# Patient Record
Sex: FEMALE | Race: WHITE | URBAN - METROPOLITAN AREA
[De-identification: names, ages, dates, MRNs, and addresses within clinical notes are randomized per-mention and may not be internally consistent; named-entity substitution may affect disease eponyms.]

---

## 2023-02-16 NOTE — PROGRESS NOTES
Annual exam    Marilee Mobley is a 29 y.o. presenting for annual exam. Her main concerns today include wellness screening and future pregnancy planning. She has several topics for discussion today. Currently using Nuvaring for contraception. She and her , Erlinda Lucio, interested in future conception.  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 He quit his job in Dec to start this new business. Patient states she has had anxiety since childhood, has not taken any medication for this recently. Does have a therapist.  She took zoloft in college for about 2 years and it was helpful during that time. Has an upcoming appt with a Psych NP, this will be her first visit. Also has herniated disc at L5-S1, has received multiple injections to manage pain, currently takes occasional gabapentin. Patient previously requested to have laminectomy done but surgeon did not recommend d/t fissures in discs above and below. Now not having any pain, but in the past few months she has noted a slight increase in pain since she has been out of her gym regimen. 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     She declines a chaperone during the gynecologic exam today. Ob/Gyn Hx:  G0  LMP - 2/13/23  Menses - regular  Contraception - Nuvaring  STI - no  SA - yes    Health maintenance:  Pap - normal last year per pt (at [facility]), had colposcopy following prior 2 paps  Gardasil -      Past Medical History:   Diagnosis Date    Anxiety        History reviewed. No pertinent surgical history. History reviewed. No pertinent family history.     Social History     Socioeconomic History    Marital status:      Spouse name: Not on file    Number of children: Not on file    Years of education: Not on file    Highest education level: Not on file   Occupational History    Not on file Tobacco Use    Smoking status: Never    Smokeless tobacco: Never   Substance and Sexual Activity    Alcohol use: Not on file    Drug use: Not on file    Sexual activity: Yes     Partners: Male     Birth control/protection: Inserts   Other Topics Concern    Not on file   Social History Narrative    Not on file     Social Determinants of Health     Financial Resource Strain: Not on file   Food Insecurity: Not on file   Transportation Needs: Not on file   Physical Activity: Not on file   Stress: Not on file   Social Connections: Not on file   Intimate Partner Violence: Not on file   Housing Stability: Not on file           No Known Allergies    Review of Systems - History obtained from the patient  Constitutional: negative for weight loss, fever, night sweats  HEENT: negative for hearing loss, earache, congestion, snoring, sorethroat  CV: negative for chest pain, palpitations, edema  Resp: negative for cough, shortness of breath, wheezing  GI: negative for change in bowel habits, abdominal pain, black or bloody stools  : negative for frequency, dysuria, hematuria, vaginal discharge  MSK: negative for back pain, joint pain, muscle pain  Breast: negative for breast lumps, nipple discharge, galactorrhea  Skin :negative for itching, rash, hives  Neuro: negative for dizziness, headache, confusion, weakness  Psych: negative for anxiety, depression, change in mood  Heme/lymph: negative for bleeding, bruising, pallor    Physical Exam    Visit Vitals  /64   Wt 143 lb (64.9 kg)   LMP 02/13/2023       Constitutional  Appearance: well-nourished, well developed, alert, in no acute distress    HENT  Head and Face: appears normal    Neck  Inspection/Palpation: normal appearance, no masses or tenderness  Lymph Nodes: no lymphadenopathy present  Thyroid: gland size normal, nontender, no nodules or masses present on palpation    Chest  Respiratory Effort: non-labored breathing  Auscultation: CTAB, normal breath sounds    Cardiovascular  Heart: Auscultation: regular rate and rhythm without murmur  Extremities: no peripheral edema    Breasts  Inspection of Breasts: breasts symmetrical, no skin changes, no discharge present, nipple appearance normal, no skin retraction present  Palpation of Breasts and Axillae: no masses present on palpation, no breast tenderness  Axillary Lymph Nodes: no lymphadenopathy present    Gastrointestinal  Abdominal Examination: abdomen non-tender to palpation, normal bowel sounds, no masses present  Liver and spleen: no hepatomegaly present, spleen not palpable  Hernias: no hernias identified    Genitourinary  External Genitalia: normal appearance for age, no discharge present, no tenderness present, no inflammatory lesions present, no masses present, no atrophy present  Vagina: normal vaginal vault without central or paravaginal defects, no discharge present, no inflammatory lesions present, no masses present  Bladder: non-tender to palpation  Urethra: appears normal  Cervix: normal   Uterus: normal size, shape and consistency  Adnexa: no adnexal tenderness present, no adnexal masses present  Perineum: perineum within normal limits, no evidence of trauma, no rashes or skin lesions present    Skin  General Inspection: no rash, no lesions identified    Neurologic/Psychiatric  Mental Status:  Orientation: grossly oriented to person, place and time  Mood and Affect: mood normal, affect appropriate      Assessment/Plan:  29 y.o. presenting for annual exam. Overall doing well. Well woman exam:  Normal gynecologic and breast exams. Healthy habits and lifestyle reviewed. Pap with HPV performed today. Patient declines STD screening. Contraception and menstrual regulation - patient opts for cont nuvaring for now. Preconception planning -   She will likely start conception attempts in about 6 months.  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 Cont PNV. Discussed methods of ovulation prediction and timed intercourse. We discussed her concerns in detail. Anxiety - cont therapist sessions and agree with Psych consult for additional assistance. Reassured re the safety of most SSRIs in pregnancy and postpartum. Also advised buspar is a good option for episodic anxiety. L5-S1 herniated disc - s/p PT in the past. Discussed I feel resuming PT and core/back strenghtening exercises would be of benefit when she is closer to conception attempts.        Jaye Hodges MD

## 2023-02-17 ENCOUNTER — OFFICE VISIT (OUTPATIENT)
Dept: OBGYN CLINIC | Age: 33
End: 2023-02-17
Payer: COMMERCIAL

## 2023-02-17 VITALS — DIASTOLIC BLOOD PRESSURE: 64 MMHG | SYSTOLIC BLOOD PRESSURE: 106 MMHG | WEIGHT: 143 LBS

## 2023-02-17 DIAGNOSIS — Z01.419 ENCOUNTER FOR GYNECOLOGICAL EXAMINATION WITHOUT ABNORMAL FINDING: Primary | ICD-10-CM

## 2023-02-17 RX ORDER — GABAPENTIN 100 MG/1
100 CAPSULE ORAL 3 TIMES DAILY
COMMUNITY
Start: 2022-04-25

## 2023-02-17 RX ORDER — ETONOGESTREL AND ETHINYL ESTRADIOL 11.7; 2.7 MG/1; MG/1
1 INSERT, EXTENDED RELEASE VAGINAL
COMMUNITY
Start: 2022-04-01

## 2023-04-26 ENCOUNTER — OFFICE VISIT (OUTPATIENT)
Dept: ORTHOPEDIC SURGERY | Age: 33
End: 2023-04-26
Payer: COMMERCIAL

## 2023-04-26 VITALS — WEIGHT: 140 LBS | BODY MASS INDEX: 25.76 KG/M2 | HEIGHT: 62 IN

## 2023-04-26 DIAGNOSIS — M43.10 SPONDYLOLYSIS WITH SPONDYLOLISTHESIS: ICD-10-CM

## 2023-04-26 DIAGNOSIS — M43.06 SPONDYLOLYSIS OF LUMBAR REGION: ICD-10-CM

## 2023-04-26 DIAGNOSIS — G89.29 CHRONIC LOW BACK PAIN, UNSPECIFIED BACK PAIN LATERALITY, UNSPECIFIED WHETHER SCIATICA PRESENT: Primary | ICD-10-CM

## 2023-04-26 DIAGNOSIS — M54.50 CHRONIC LOW BACK PAIN, UNSPECIFIED BACK PAIN LATERALITY, UNSPECIFIED WHETHER SCIATICA PRESENT: Primary | ICD-10-CM

## 2023-04-26 PROCEDURE — 99204 OFFICE O/P NEW MOD 45 MIN: CPT | Performed by: STUDENT IN AN ORGANIZED HEALTH CARE EDUCATION/TRAINING PROGRAM

## 2023-04-26 RX ORDER — MELOXICAM 15 MG/1
15 TABLET ORAL DAILY
Qty: 14 TABLET | Refills: 3 | Status: CANCELLED | OUTPATIENT
Start: 2023-04-26

## 2023-04-26 NOTE — PROGRESS NOTES
Angel Hendrickson (: 1990) is a 29 y.o. female here for evaluation of the following chief complaint(s):  Back Pain       ASSESSMENT/PLAN:  Below is the assessment and plan developed based on review of pertinent history, physical exam, labs, studies, and medications. 1. Chronic low back pain, unspecified back pain laterality, unspecified whether sciatica present  -     XR SPINE LUMB MIN 4 V; Future  -     MRI LUMB SPINE WO CONT; Future  2. Spondylolysis of lumbar region  3. Spondylolysis with spondylolisthesis      Return in about 4 weeks (around 2023) for MRI review. Follow-up after MRI lumbar spine. Consider MBB at next visit. Red flag symptoms discussed with the patient. Patient is to present to the emergency department if any of these symptoms occur. Patient verbalized understanding and agrees to proceed with the aforementioned plan. Thank you for allowing me to participate in the care of this patient. SUBJECTIVE/OBJECTIVE:    HPI    Patient is a pleasant 29 y.o. F who presents with atraumatic chronic mechanical back pain. Known h/o L5 pars defect with Grade 1 spondy. Symptoms have been slowly progressive over the past several years but worse over the past several months. It is worse with bending/twisting/lifting through the spine, activity, prolonged sitting, standing, and walking and better with lying flat and rest.    Patient ambulates wo assist.  Normal ambulatory capacity. No gait instability. No fall history. No upper extremity dexterity issues. No bowel/bladder control issues. No other red flag symptoms. Therapies (Rx/PT/Injections): no formal PT within the past 6 months, NSAIDs with only partial/temporary relief, and no recent injections    Relevant PMH/Social Hx: Trying to get pregnant. Relevant Orthopedic Hx: LBP with LR. Prior Spine Surgery: None.       Chief Complaint   Patient presents with    Back Pain     Current Outpatient Medications   Medication Sig ethinyl estradiol-etonogestrel (NUVARING) 0.12-0.015 mg/24 hr vaginal ring Insert 1 ring vaginally every 4 weeks    gabapentin (NEURONTIN) 100 mg capsule Take 1 Capsule by mouth three (3) times daily. prenatal multivit-ca-min-fe-fa tab Take  by mouth. No current facility-administered medications for this visit. Past Medical History:   Diagnosis Date    Anxiety      History reviewed. No pertinent surgical history. History reviewed. No pertinent family history.   Social History     Tobacco Use    Smoking status: Never    Smokeless tobacco: Never      Social History     Tobacco Use   Smoking Status Never   Smokeless Tobacco Never     Social History     Substance and Sexual Activity   Alcohol Use None       Review of Systems  Red flag symptoms: None  Bowel/Bladder/Saddle Anesthesia: Denies  Weakness/Sensory Disturbance: Denies  Ambulation/Falls: Denies  Constitutional Symptoms (F/C/NS/WL): Denies    Ht 5' 2\" (1.575 m)   Wt 140 lb (63.5 kg)   BMI 25.61 kg/m²      Physical Exam    GENERAL:  AAOx3, appears stated age, no distress  Body habitus: Normal    LOWER EXTREMITIES:  Gait: Intact heel and toe gait, intact tandem gait   Motor: 5/5 in all myotomes L3-S1 bilaterally  Sensory: Intact to light touch in all dermatomes L4-S1 bilaterally  Reflexes: Normal L4 and S1 bilaterally  Pathological reflexes: No sustained clonus, down going Babinski bilaterally   Special tests: Neg seated SLR BL    UPPER EXTREMITIES:  Motor: 5/5 in all myotomes C5-T1 bilaterally  Sensory: Intact to light touch in all dermatomes C5-T1 bilaterally  Reflexes: Normal C5-C7 reflexes bilaterally  Pathological reflexes: Negative Lalo's bilaterally  Special tests: Negative Spurling's    NECK/BACK:  Integumentary: Normal  Palpation/ROM: Paraspinal tenderness to palpation; decreased range of motion in all planes, primarily flexion/extension/rotation, secondary to pain and stiffness      IMAGING:    XR Results (most recent):  Results from Appointment encounter on 04/26/23    XR SPINE LUMB MIN 4 V    Narrative  4 views lumbar spine including flexion-extension with grade 1 spondylolisthesis secondary to chronic pars defect. No gross instability on dynamic films. No coronal deformity. An electronic signature was used to authenticate this note.   -- Master Billingsley DO

## 2023-04-26 NOTE — PROGRESS NOTES
1. Have you been to the ER, urgent care clinic since your last visit? Hospitalized since your last visit? No    2. Have you seen or consulted any other health care providers outside of the [address] [place] [address] since your last visit? Include any pap smears or colon screening.  No  Chief Complaint   Patient presents with    Back Pain

## 2023-04-27 DIAGNOSIS — M54.9 BACK PAIN, UNSPECIFIED BACK LOCATION, UNSPECIFIED BACK PAIN LATERALITY, UNSPECIFIED CHRONICITY: Primary | ICD-10-CM

## 2023-05-02 ENCOUNTER — HOSPITAL ENCOUNTER (OUTPATIENT)
Dept: MRI IMAGING | Age: 33
Discharge: HOME | End: 2023-05-02
Attending: STUDENT IN AN ORGANIZED HEALTH CARE EDUCATION/TRAINING PROGRAM
Payer: COMMERCIAL

## 2023-05-02 DIAGNOSIS — M54.50 CHRONIC LOW BACK PAIN, UNSPECIFIED BACK PAIN LATERALITY, UNSPECIFIED WHETHER SCIATICA PRESENT: ICD-10-CM

## 2023-05-02 DIAGNOSIS — G89.29 CHRONIC LOW BACK PAIN, UNSPECIFIED BACK PAIN LATERALITY, UNSPECIFIED WHETHER SCIATICA PRESENT: ICD-10-CM

## 2023-05-02 PROCEDURE — 72148 MRI LUMBAR SPINE W/O DYE: CPT

## 2023-09-21 LAB
CHOLEST SERPL-MCNC: 180 MG/DL
GLUCOSE SERPL-MCNC: 92 MG/DL (ref 65–100)
HDLC SERPL-MCNC: 74 MG/DL
LDLC SERPL CALC-MCNC: 95.4 MG/DL (ref 0–100)
TRIGL SERPL-MCNC: 53 MG/DL

## 2024-07-02 LAB
CHOLEST SERPL-MCNC: 159 MG/DL
GLUCOSE SERPL-MCNC: 91 MG/DL (ref 65–100)
HDLC SERPL-MCNC: 73 MG/DL
LDLC SERPL CALC-MCNC: 72.4 MG/DL (ref 0–100)
TRIGL SERPL-MCNC: 68 MG/DL

## 2024-07-03 ENCOUNTER — OFFICE VISIT (OUTPATIENT)
Age: 34
End: 2024-07-03
Payer: COMMERCIAL

## 2024-07-03 VITALS
SYSTOLIC BLOOD PRESSURE: 116 MMHG | HEIGHT: 62 IN | WEIGHT: 151 LBS | DIASTOLIC BLOOD PRESSURE: 70 MMHG | BODY MASS INDEX: 27.79 KG/M2

## 2024-07-03 DIAGNOSIS — Z01.419 ENCOUNTER FOR WELL WOMAN EXAM WITH ROUTINE GYNECOLOGICAL EXAM: Primary | ICD-10-CM

## 2024-07-03 PROCEDURE — 99395 PREV VISIT EST AGE 18-39: CPT | Performed by: OBSTETRICS & GYNECOLOGY

## 2024-07-03 RX ORDER — MULTIVITAMIN,THERAPEUTIC
1 TABLET ORAL DAILY
COMMUNITY

## 2024-07-03 RX ORDER — DESVENLAFAXINE SUCCINATE 100 MG/1
TABLET, EXTENDED RELEASE ORAL
COMMUNITY
Start: 2023-06-01

## 2024-07-03 NOTE — PROGRESS NOTES
Chief Complaint   Patient presents with    Annual Exam     Pt being seen for annual exam. Cycle length has changed but is still regular. Relays this is likely due to the stress from her previous relationship. Does not feel stressed anymore but cycle has not gone back to what it was.    Wants to discuss non-hormonal birth control options, leaning copper IUD    Ob/Gyn Hx:  G0  LMP - Patient's last menstrual period was 06/11/2024.  Menses -  regular, last 3-4 days. Cycle length has extended by 7-9 days  Contraception - none  Hx of STI - declines testing  SA - yes, male    Health maintenance:  Last Pap: 2/17/2023 NIL, HPV negative  Gardasil: completed    1. Have you been to the ER, urgent care clinic, or hospitalized since your last visit? no    2. Have you seen or consulted any other health care providers outside of the [facility] since your last visit? no    Patient declines chaperone.    Tatiana Almaguer LPN       
  Transportation Needs: Not on file   Physical Activity: Not on file   Stress: Not on file   Social Connections: Not on file   Intimate Partner Violence: Not on file   Housing Stability: Not on file       Current Outpatient Medications   Medication Sig Dispense Refill    PRISTIQ 100 MG TB24 extended release tablet       Multiple Vitamin (THERA/BETA-CAROTENE) TABS Take 1 tablet by mouth daily      etonogestrel-ethinyl estradiol (NUVARING) 0.12-0.015 MG/24HR vaginal ring Place 1 each vaginally (Patient not taking: Reported on 7/3/2024)      gabapentin (NEURONTIN) 100 MG capsule Take 1 capsule by mouth 3 times daily. (Patient not taking: Reported on 7/3/2024)       No current facility-administered medications for this visit.       No Known Allergies    Review of Systems - History obtained from the patient  Constitutional: negative for weight loss, fever, night sweats  HEENT: negative for hearing loss, earache, congestion, snoring, sorethroat  CV: negative for chest pain, palpitations, edema  Resp: negative for cough, shortness of breath, wheezing  GI: negative for change in bowel habits, abdominal pain, black or bloody stools  : negative for frequency, dysuria, hematuria, vaginal discharge  MSK: negative for back pain, joint pain, muscle pain  Breast: negative for breast lumps, nipple discharge, galactorrhea  Skin :negative for itching, rash, hives  Neuro: negative for dizziness, headache, confusion, weakness  Psych: negative for anxiety, depression, change in mood  Heme/lymph: negative for bleeding, bruising, pallor    Physical Exam    /70   Ht 1.575 m (5' 2\")   Wt 68.5 kg (151 lb)   LMP 06/11/2024   BMI 27.62 kg/m²     Constitutional  Appearance: well-nourished, well developed, alert, in no acute distress    HENT  Head and Face: appears normal    Neck  Inspection/Palpation: normal appearance, no masses or tenderness  Lymph Nodes: no lymphadenopathy present  Thyroid: gland size normal, nontender, no

## 2024-07-19 ENCOUNTER — OFFICE VISIT (OUTPATIENT)
Age: 34
End: 2024-07-19

## 2024-07-19 VITALS
SYSTOLIC BLOOD PRESSURE: 110 MMHG | HEIGHT: 62 IN | BODY MASS INDEX: 27.6 KG/M2 | WEIGHT: 150 LBS | DIASTOLIC BLOOD PRESSURE: 70 MMHG

## 2024-07-19 DIAGNOSIS — Z30.430 ENCOUNTER FOR IUD INSERTION: Primary | ICD-10-CM

## 2024-07-19 LAB
HCG, PREGNANCY, URINE, POC: NEGATIVE
VALID INTERNAL CONTROL, POC: YES

## 2024-07-19 SDOH — ECONOMIC STABILITY: FOOD INSECURITY: WITHIN THE PAST 12 MONTHS, THE FOOD YOU BOUGHT JUST DIDN'T LAST AND YOU DIDN'T HAVE MONEY TO GET MORE.: NEVER TRUE

## 2024-07-19 SDOH — ECONOMIC STABILITY: HOUSING INSECURITY
IN THE LAST 12 MONTHS, WAS THERE A TIME WHEN YOU DID NOT HAVE A STEADY PLACE TO SLEEP OR SLEPT IN A SHELTER (INCLUDING NOW)?: NO

## 2024-07-19 SDOH — ECONOMIC STABILITY: FOOD INSECURITY: WITHIN THE PAST 12 MONTHS, YOU WORRIED THAT YOUR FOOD WOULD RUN OUT BEFORE YOU GOT MONEY TO BUY MORE.: NEVER TRUE

## 2024-07-19 SDOH — ECONOMIC STABILITY: INCOME INSECURITY: HOW HARD IS IT FOR YOU TO PAY FOR THE VERY BASICS LIKE FOOD, HOUSING, MEDICAL CARE, AND HEATING?: NOT HARD AT ALL

## 2024-07-19 ASSESSMENT — PATIENT HEALTH QUESTIONNAIRE - PHQ9
SUM OF ALL RESPONSES TO PHQ QUESTIONS 1-9: 0
SUM OF ALL RESPONSES TO PHQ QUESTIONS 1-9: 0
1. LITTLE INTEREST OR PLEASURE IN DOING THINGS: NOT AT ALL
SUM OF ALL RESPONSES TO PHQ QUESTIONS 1-9: 0
SUM OF ALL RESPONSES TO PHQ QUESTIONS 1-9: 0
SUM OF ALL RESPONSES TO PHQ9 QUESTIONS 1 & 2: 0
2. FEELING DOWN, DEPRESSED OR HOPELESS: NOT AT ALL

## 2024-07-19 NOTE — PROGRESS NOTES
Kyleena IUD INSERTION  Indications:  Kiera Mayo is a No obstetric history on file.,  33 y.o. female White (non-) Patient's last menstrual period was 07/13/2024.  She  presents for insertion of an IUD.    The risks, benefits and alternatives of IUD insertion were discussed in detail.  She also has reviewed written information on the IUD. She has elected to proceed with the insertion today and she states she has no further questions.   Consent was obtained.   A urine pregnancy test was negative.      Procedure:  The pelvic exam revealed normal external genitalia. On bimanual exam the uterus was anteverted and normal in size with no tenderness present. A speculum was inserted into the vagina and the cervix was visualized. The cervix was prepped with betadine solution. The anterior lip of the cervix was grasped with a single toothed tenaculum.   The uterus was sounded with a Aviles sound to 7 centimeters.     A Kyleena IUD was then inserted in standard manner without difficulty.   The strings were cut to 2.5 centimeters.    She experienced a moderate  amount of cramping.     Post Procedure Status:   She tolerated the procedure with mild discomfort. The patient was observed for 5 minutes after the insertion.   There were no complications.   Patient was discharged in stable condition.  See nursing MAR for IUD lot number.     Falguni Oh MD

## 2024-07-19 NOTE — PROGRESS NOTES
Chief Complaint   Patient presents with    iud insertion     Pt being seen for Kyleena IUD insertion    Ob/Gyn Hx:  G0  LMP - Patient's last menstrual period was 7/13/2024.  Menses -  regular, last 3-4 days. Cycle length has extended by 7-9 days  Contraception - desires kyleena IUD  Hx of STI - declines testing  SA - yes, male    Health Maintenance:  Last Pap:  2/17/2023 NIL, HPV negative  Gardasil: completed    1. Have you been to the ER, urgent care clinic, or hospitalized since your last visit? No    2. Have you seen or consulted any other health care providers outside of the [facility] since your last visit? No    She accepts  a chaperone during the gynecologic exam today.      Tatiana Almaguer LPN

## 2024-08-29 ENCOUNTER — OFFICE VISIT (OUTPATIENT)
Age: 34
End: 2024-08-29
Payer: COMMERCIAL

## 2024-08-29 VITALS
DIASTOLIC BLOOD PRESSURE: 79 MMHG | WEIGHT: 154 LBS | SYSTOLIC BLOOD PRESSURE: 127 MMHG | TEMPERATURE: 99.1 F | HEART RATE: 80 BPM | BODY MASS INDEX: 28.17 KG/M2 | OXYGEN SATURATION: 97 %

## 2024-08-29 DIAGNOSIS — Z30.431 IUD CHECK UP: Primary | ICD-10-CM

## 2024-08-29 PROCEDURE — 99213 OFFICE O/P EST LOW 20 MIN: CPT | Performed by: OBSTETRICS & GYNECOLOGY

## 2024-08-29 NOTE — PROGRESS NOTES
Social Connections: Not on file   Intimate Partner Violence: Not on file   Housing Stability: Unknown (7/19/2024)    Housing Stability Vital Sign     Unable to Pay for Housing in the Last Year: Not on file     Number of Places Lived in the Last Year: Not on file     Unstable Housing in the Last Year: No        Current Outpatient Medications   Medication Sig Dispense Refill    PRISTIQ 100 MG TB24 extended release tablet       Multiple Vitamin (THERA/BETA-CAROTENE) TABS Take 1 tablet by mouth daily      etonogestrel-ethinyl estradiol (NUVARING) 0.12-0.015 MG/24HR vaginal ring Place 1 each vaginally (Patient not taking: Reported on 7/3/2024)      gabapentin (NEURONTIN) 100 MG capsule Take 1 capsule by mouth 3 times daily. (Patient not taking: Reported on 7/3/2024)       No current facility-administered medications for this visit.       No Known Allergies    Review of Systems - History obtained from the patient  Constitutional: negative for weight loss, fever, night sweats  HEENT: negative for hearing loss, earache, congestion, snoring, sorethroat  CV: negative for chest pain, palpitations, edema  Resp: negative for cough, shortness of breath, wheezing  GI: negative for change in bowel habits, abdominal pain, black or bloody stools  : negative for frequency, dysuria, hematuria, vaginal discharge  MSK: negative for back pain, joint pain, muscle pain  Breast: negative for breast lumps, nipple discharge, galactorrhea  Skin :negative for itching, rash, hives  Neuro: negative for dizziness, headache, confusion, weakness  Psych: negative for anxiety, depression, change in mood  Heme/lymph: negative for bleeding, bruising, pallor    Physical Exam    /79   Pulse 80   Temp 99.1 °F (37.3 °C) (Oral)   Wt 69.9 kg (154 lb)   LMP 08/13/2024   SpO2 97%   BMI 28.17 kg/m²       OBGyn Exam      Constitutional  Appearance: well-nourished, well developed, alert, in no acute distress    HENT  Head and Face: appears  normal    Neck  Inspection/Palpation: normal appearance, no masses or tenderness  Thyroid: gland size normal, nontender    Chest  Respiratory Effort: non-labored breathing    Cardiovascular  Extremities: no peripheral edema    Gastrointestinal  Abdominal Examination: abdomen non-distended, non-tender to palpation, no masses present  Liver and spleen: no hepatomegaly present, spleen not palpable  Hernias: no hernias identified    Genitourinary  External Genitalia: normal appearance for age, no discharge present, no tenderness present, no inflammatory lesions present, no masses present, no atrophy present  Vagina: normal vaginal vault without central or paravaginal defects, no discharge present, no inflammatory lesions present, no masses present  Bladder: non-tender to palpation  Urethra: appears normal  Cervix: normal IUD strings 2.5cm  Uterus: normal size, shape and consistency  Adnexa: no adnexal tenderness present, no adnexal masses present  Perineum: perineum within normal limits, no evidence of trauma, no rashes or skin lesions present    Skin  General Inspection: no rash, no lesions identified    Neurologic/Psychiatric  Mental Status:  Orientation: grossly oriented to person, place and time  Mood and Affect: mood normal, affect appropriate      Assessment/Plan:    1. IUD check up  Pelvic exam performed and Kyleena IUD strings were seen at the proper length today, reassurance provided.  Discussed bleeding patterns and menstrual changes/possible amenorrhea with the IUD.   RTO for any problems or 1 year for annual exam.         Falguni Oh MD

## 2024-08-29 NOTE — PROGRESS NOTES
Chief Complaint   Patient presents with    Follow-up     Patient being seen to have IUD placement checked. Relays that she has had some cramping and a longer cycles    Ob/Gyn Hx:  G0  LMP - 8/13/2024  Menses - first one since IUD  Contraception - Kyleena 7/19/2024   Hx of STI - none  SA - yes, male    Health Maintenance:  Last Pap:     1. Have you been to the ER, urgent care clinic, or hospitalized since your last visit? no    2. Have you seen or consulted any other health care providers outside of the [facility] since your last visit? no    Patient declines chaperone.    Tatiana Almaguer LPN